# Patient Record
Sex: MALE | Race: ASIAN | NOT HISPANIC OR LATINO
[De-identification: names, ages, dates, MRNs, and addresses within clinical notes are randomized per-mention and may not be internally consistent; named-entity substitution may affect disease eponyms.]

---

## 2021-05-17 ENCOUNTER — APPOINTMENT (OUTPATIENT)
Dept: DISASTER EMERGENCY | Facility: OTHER | Age: 26
End: 2021-05-17
Payer: MEDICAID

## 2021-05-17 PROCEDURE — 0012A: CPT

## 2021-10-04 ENCOUNTER — EMERGENCY (EMERGENCY)
Facility: HOSPITAL | Age: 26
LOS: 1 days | Discharge: HOME | End: 2021-10-04
Attending: EMERGENCY MEDICINE | Admitting: EMERGENCY MEDICINE
Payer: MEDICAID

## 2021-10-04 VITALS
HEART RATE: 88 BPM | RESPIRATION RATE: 18 BRPM | SYSTOLIC BLOOD PRESSURE: 122 MMHG | OXYGEN SATURATION: 100 % | DIASTOLIC BLOOD PRESSURE: 80 MMHG | TEMPERATURE: 98 F

## 2021-10-04 PROCEDURE — 99284 EMERGENCY DEPT VISIT MOD MDM: CPT

## 2021-10-04 RX ORDER — IBUPROFEN 200 MG
1 TABLET ORAL
Qty: 9 | Refills: 0
Start: 2021-10-04 | End: 2021-10-06

## 2021-10-04 RX ORDER — GUAIACOL AND EUGENOL .0416; .0416 G/G; G/G
1 PASTE DENTAL
Qty: 6 | Refills: 0
Start: 2021-10-04 | End: 2021-10-06

## 2021-10-04 RX ORDER — IBUPROFEN 200 MG
600 TABLET ORAL ONCE
Refills: 0 | Status: COMPLETED | OUTPATIENT
Start: 2021-10-04 | End: 2021-10-04

## 2021-10-04 RX ADMIN — Medication 600 MILLIGRAM(S): at 17:49

## 2021-10-04 NOTE — ED PROVIDER NOTE - NSFOLLOWUPCLINICS_GEN_ALL_ED_FT
[facility]  Dental  [address] [place] [address]  Phone: [phone]  Fax:     Oral & Maxillofacial Surgery  Department of Dental Medicine  [phone] [place], [place] [address]  Phone: [phone]  Fax: [phone]

## 2021-10-04 NOTE — PROGRESS NOTE ADULT - SUBJECTIVE AND OBJECTIVE BOX
CC: 27 y/o presents with pain in the with no associated swelling.    HPI: Patient reports pain started 3 days ago.    Med HX: No pertinent family history in first degree relatives    Acid reflux    No significant past surgical history    DENTAL PAIN    SysAdmin_VisitLink        Social Hx: non-contributory    EOE:   TMJ (WNL)  Trismus (-)  LAD (+)Left Swollen submandibular lymph node  Dysphagia (-)  Swelling (-)    IOE: Permanent dentition grossly intact. #19 buccal caries  Hard/Soft palate (WNL)  Tongue/Floor of Mouth (WNL)  Buccal Mucosa (WNL)  Percussion (+): #17   Palpation (-)  Mobility (-)   Swelling (-)    Radiographs: PA of lower left and panoramic taken and evaluated.   Noted possible PARL associated with 17. Recurrent caries likely to pulp on #17.     Assessment: Recurrent caries on #17 and #18. #17- Acute/Symptomatic apical periodontitis #18- Buccal and recurrent caries   (-) swelling/fistula/active signs of infection.     Treatment: Discussed clinical and radiographic findings with patient. No treatment indicated at this time due to no associated facial or gingival swelling, abscess present, or fistula present. Recommended patient be referred to either outpatient private dentist or [facility] adult dental for comprehensive dental care. All questions answered.     Recommendations:   1. Abx as per med team. OTC pain medications as needed.  2. Seek comprehensive dental care with outpatient private dentist or [facility] adult dental clinic [phone].  5. If any difficulty breathing/swallowing or fever and swelling occur, return to ED.    Dana Benitez DDS #[id number]

## 2021-10-04 NOTE — ED PROVIDER NOTE - ATTENDING CONTRIBUTION TO CARE
Patient is a 25 yo M with no chronic medical problems here for left sided molar pain for 3 days. Denies fevers, chills, swelling. He tried ibuprofen without relief. He states he tried getting a dentist but there is a language problem.     VS noted  Gen. no acute distress, Non toxic   HEENT: EOMI, mmm, left molar, ttp, no abscess noted  Lungs: CTAB/L no C/ W /R   CVS: RRR   Abd; Soft non tender, non distended   Ext: no edema  Skin: no rash  Neuro AAOx3 non focal clear speech  a/p: dental pain - plan for dental evaluation, pain control and reassess.   - Felix CARRASQUILLO

## 2021-10-04 NOTE — ED PROVIDER NOTE - PHYSICAL EXAMINATION
PHYSICAL EXAM:    GENERAL: Lying in bed comfortably  HEAD:  Normocephalic  EYES: EOMI, PERRLA, conjunctiva and sclera clear  ENT: On oral cavity exam, poor dental hygiene, with multiple cavities that are filled. Has tenderness at the location of gums of left lower jaw 1/2/3rd Molar. No apparent bleeding/discharge.  NECK: Supple  LUNG: CTA b/l; no r/r/w  HEART: RRR, +S1/S2; No m/r/g  ABDOMEN: soft, NT/ND; BS audible   EXTREMITIES:  2+ Peripheral Pulses. No clubbing, cyanosis, or edema  NERVOUS SYSTEM:  AAOx3, speech clear. No sensory/motor deficits   SKIN: No rashes or lesions

## 2021-10-04 NOTE — ED PROVIDER NOTE - NS ED ROS FT
CONSTITUTIONAL: c/o weakness, No fevers or chills  EYES/ENT: c/o toothache. No visual changes;  No vertigo or throat pain   NECK: No pain   RESPIRATORY: No cough, wheezing, shortness of breath  CARDIOVASCULAR: No chest pain, palpitations  GASTROINTESTINAL: No abdominal pain, nausea, vomiting, diarrhea or constipation.   GENITOURINARY: No dysuria, frequency  NEUROLOGICAL: No numbness or weakness  SKIN: No rashes, or lesions     All other review of systems is negative unless indicated above.

## 2021-10-04 NOTE — ED PROVIDER NOTE - CLINICAL SUMMARY MEDICAL DECISION MAKING FREE TEXT BOX
26 Yr old female now presents with left lower jaw toothache for 2 days. VS wnl. PE On oral cavity exam, poor dental hygiene, with multiple cavities that are filled. Has tenderness at the location of gums of left lower jaw 1/2/3rd Molar. No apparent bleeding/discharge. Otherwise rest is wnl. Plan for analgesia, dental review.

## 2021-10-04 NOTE — ED PROVIDER NOTE - PROGRESS NOTE DETAILS
Dental evaluating patient. Per dental, Augmentin for 1 week. lymph node on left, submandibular, follows up in dental clinic. soft food diet. 17 will have taken out, 18 has a cavity. Follow up asap.

## 2021-10-04 NOTE — ED PROVIDER NOTE - OBJECTIVE STATEMENT
26 Yr old female now presents with left lower jaw toothache for 2 days.   Per patient, left lower jaw gums in territory of 1st, 2nd and 3rd Molar are tender. No fever, apparent discharge, throat ache, breathing difficulty, swallowing difficulty. He was due to follow up with his dentist h/e was unsuccessful in arranging an early appointment so came to ED for evaluation. He denies smoking, tobacco chewing, betel nut use.   He has a history of poor oral hygiene and prior dental work in past (Multiple gum cavities requiring fillings).   Also relates a prior history of minimal gum bleed which is now improving after he switched his toothpaste to Sensodyne.

## 2021-10-04 NOTE — ED PROVIDER NOTE - PATIENT PORTAL LINK FT
You can access the FollowMyHealth Patient Portal offered by [facility] by registering at the following website: [email or web address]. By joining [employer]’s FollowMyHealth portal, you will also be able to view your health information using other applications (apps) compatible with our system.

## 2024-09-20 ENCOUNTER — EMERGENCY (EMERGENCY)
Facility: HOSPITAL | Age: 29
LOS: 1 days | Discharge: HOME | End: 2024-09-20
Attending: STUDENT IN AN ORGANIZED HEALTH CARE EDUCATION/TRAINING PROGRAM | Admitting: STUDENT IN AN ORGANIZED HEALTH CARE EDUCATION/TRAINING PROGRAM
Payer: COMMERCIAL

## 2024-09-20 VITALS
HEIGHT: 70 IN | WEIGHT: 223.55 LBS | RESPIRATION RATE: 18 BRPM | OXYGEN SATURATION: 97 % | HEART RATE: 91 BPM | SYSTOLIC BLOOD PRESSURE: 130 MMHG | TEMPERATURE: 98 F | DIASTOLIC BLOOD PRESSURE: 85 MMHG

## 2024-09-20 VITALS
HEART RATE: 98 BPM | OXYGEN SATURATION: 99 % | DIASTOLIC BLOOD PRESSURE: 77 MMHG | SYSTOLIC BLOOD PRESSURE: 122 MMHG | RESPIRATION RATE: 17 BRPM | TEMPERATURE: 99 F

## 2024-09-20 PROBLEM — K21.9 GASTRO-ESOPHAGEAL REFLUX DISEASE WITHOUT ESOPHAGITIS: Chronic | Status: ACTIVE | Noted: 2021-10-04

## 2024-09-20 LAB
ALBUMIN SERPL ELPH-MCNC: 4.6 G/DL — SIGNIFICANT CHANGE UP (ref 3.3–5)
ALP SERPL-CCNC: 69 U/L — SIGNIFICANT CHANGE UP (ref 40–120)
ALT FLD-CCNC: 65 U/L — HIGH (ref 4–41)
ANION GAP SERPL CALC-SCNC: 15 MMOL/L — HIGH (ref 7–14)
AST SERPL-CCNC: 32 U/L — SIGNIFICANT CHANGE UP (ref 4–40)
BASOPHILS # BLD AUTO: 0.04 K/UL — SIGNIFICANT CHANGE UP (ref 0–0.2)
BASOPHILS NFR BLD AUTO: 0.5 % — SIGNIFICANT CHANGE UP (ref 0–2)
BILIRUB SERPL-MCNC: 0.6 MG/DL — SIGNIFICANT CHANGE UP (ref 0.2–1.2)
BUN SERPL-MCNC: 10 MG/DL — SIGNIFICANT CHANGE UP (ref 7–23)
CALCIUM SERPL-MCNC: 9.5 MG/DL — SIGNIFICANT CHANGE UP (ref 8.4–10.5)
CHLORIDE SERPL-SCNC: 102 MMOL/L — SIGNIFICANT CHANGE UP (ref 98–107)
CO2 SERPL-SCNC: 23 MMOL/L — SIGNIFICANT CHANGE UP (ref 22–31)
CREAT SERPL-MCNC: 0.8 MG/DL — SIGNIFICANT CHANGE UP (ref 0.5–1.3)
EGFR: 124 ML/MIN/1.73M2 — SIGNIFICANT CHANGE UP
EOSINOPHIL # BLD AUTO: 0.36 K/UL — SIGNIFICANT CHANGE UP (ref 0–0.5)
EOSINOPHIL NFR BLD AUTO: 4.5 % — SIGNIFICANT CHANGE UP (ref 0–6)
GLUCOSE SERPL-MCNC: 102 MG/DL — HIGH (ref 70–99)
HCT VFR BLD CALC: 43.9 % — SIGNIFICANT CHANGE UP (ref 39–50)
HGB BLD-MCNC: 14.4 G/DL — SIGNIFICANT CHANGE UP (ref 13–17)
IANC: 4.71 K/UL — SIGNIFICANT CHANGE UP (ref 1.8–7.4)
IMM GRANULOCYTES NFR BLD AUTO: 0.4 % — SIGNIFICANT CHANGE UP (ref 0–0.9)
LYMPHOCYTES # BLD AUTO: 2.07 K/UL — SIGNIFICANT CHANGE UP (ref 1–3.3)
LYMPHOCYTES # BLD AUTO: 25.7 % — SIGNIFICANT CHANGE UP (ref 13–44)
MCHC RBC-ENTMCNC: 27.3 PG — SIGNIFICANT CHANGE UP (ref 27–34)
MCHC RBC-ENTMCNC: 32.8 GM/DL — SIGNIFICANT CHANGE UP (ref 32–36)
MCV RBC AUTO: 83.3 FL — SIGNIFICANT CHANGE UP (ref 80–100)
MONOCYTES # BLD AUTO: 0.86 K/UL — SIGNIFICANT CHANGE UP (ref 0–0.9)
MONOCYTES NFR BLD AUTO: 10.7 % — SIGNIFICANT CHANGE UP (ref 2–14)
NEUTROPHILS # BLD AUTO: 4.71 K/UL — SIGNIFICANT CHANGE UP (ref 1.8–7.4)
NEUTROPHILS NFR BLD AUTO: 58.2 % — SIGNIFICANT CHANGE UP (ref 43–77)
NRBC # BLD: 0 /100 WBCS — SIGNIFICANT CHANGE UP (ref 0–0)
NRBC # FLD: 0 K/UL — SIGNIFICANT CHANGE UP (ref 0–0)
PLATELET # BLD AUTO: 236 K/UL — SIGNIFICANT CHANGE UP (ref 150–400)
POTASSIUM SERPL-MCNC: 3.9 MMOL/L — SIGNIFICANT CHANGE UP (ref 3.5–5.3)
POTASSIUM SERPL-SCNC: 3.9 MMOL/L — SIGNIFICANT CHANGE UP (ref 3.5–5.3)
PROT SERPL-MCNC: 7.8 G/DL — SIGNIFICANT CHANGE UP (ref 6–8.3)
RBC # BLD: 5.27 M/UL — SIGNIFICANT CHANGE UP (ref 4.2–5.8)
RBC # FLD: 12 % — SIGNIFICANT CHANGE UP (ref 10.3–14.5)
SODIUM SERPL-SCNC: 140 MMOL/L — SIGNIFICANT CHANGE UP (ref 135–145)
TROPONIN T, HIGH SENSITIVITY RESULT: <6 NG/L — SIGNIFICANT CHANGE UP
WBC # BLD: 8.07 K/UL — SIGNIFICANT CHANGE UP (ref 3.8–10.5)
WBC # FLD AUTO: 8.07 K/UL — SIGNIFICANT CHANGE UP (ref 3.8–10.5)

## 2024-09-20 PROCEDURE — 71046 X-RAY EXAM CHEST 2 VIEWS: CPT | Mod: 26

## 2024-09-20 PROCEDURE — 99285 EMERGENCY DEPT VISIT HI MDM: CPT

## 2024-09-20 PROCEDURE — 93010 ELECTROCARDIOGRAM REPORT: CPT

## 2024-09-20 RX ORDER — FAMOTIDINE 10 MG/ML
20 INJECTION INTRAVENOUS ONCE
Refills: 0 | Status: COMPLETED | OUTPATIENT
Start: 2024-09-20 | End: 2024-09-20

## 2024-09-20 RX ORDER — MAGNESIUM, ALUMINUM HYDROXIDE 200-225/5
30 SUSPENSION, ORAL (FINAL DOSE FORM) ORAL ONCE
Refills: 0 | Status: COMPLETED | OUTPATIENT
Start: 2024-09-20 | End: 2024-09-20

## 2024-09-20 RX ADMIN — Medication 30 MILLILITER(S): at 05:23

## 2024-09-20 RX ADMIN — FAMOTIDINE 20 MILLIGRAM(S): 10 INJECTION INTRAVENOUS at 05:23

## 2024-09-20 NOTE — ED PROVIDER NOTE - ATTENDING CONTRIBUTION TO CARE
30 yo M who presents to the ED c/o L sided chest pain that began this evening with associated nausea. No other symptoms. Denies any recent trauma to the chest. EKG is NSR with no ST changes. Plan for labs, CXR, Gi cocktail, re-assess

## 2024-09-20 NOTE — ED ADULT NURSE NOTE - NSFALLUNIVINTERV_ED_ALL_ED
Bed/Stretcher in lowest position, wheels locked, appropriate side rails in place/Call bell, personal items and telephone in reach/Instruct patient to call for assistance before getting out of bed/chair/stretcher/Non-slip footwear applied when patient is off stretcher/[place] to call system/Physically safe environment - no spills, clutter or unnecessary equipment/Purposeful proactive rounding/Room/bathroom lighting operational, light cord in reach

## 2024-09-20 NOTE — ED PROVIDER NOTE - PROGRESS NOTE DETAILS
Kathi, PGY1: patient feels much improved with pepcid maalox. Labs and ekg negative for acs. WIll dispo home.

## 2024-09-20 NOTE — ED ADULT NURSE NOTE - OBJECTIVE STATEMENT
Pt handoff received, alert and oriented x4, able to move all extremities and follow commands. Pt reports left chest and shoulder pain x3hrs prior to ED arrival. Reports pain is worse with inhalation. Denies difficulty breathing, shortness of breath, dizziness, headache, nausea, vomiting, abdominal pain. Denies any past medical history. Placed on cardiac monitoring. Pending dispo.

## 2024-09-20 NOTE — ED PROVIDER NOTE - NSFOLLOWUPINSTRUCTIONS_ED_ALL_ED_FT
There were no signs of an emergency medical condition on completion of today's workup.  You will need further medical care and evaluation for this pain. It is likely your pain is caused by acid reflux or gastritis.    You can take over-the-counter Pepcid (Famotidine) for linger symptoms.     Follow up with your medical doctor in 2-3 days. You may also call [phone] to speak with a representative to assist follow up care with medicine, surgery, or specialists.    If you develop worsening chest pain, shortness of breath, nausea or vomiting, please return to the emergency room.    Return for any persistent, worsening symptoms, or ANY concerns at all.

## 2024-09-20 NOTE — ED PROVIDER NOTE - OBJECTIVE STATEMENT
Beatriz Calderónrex is a 30yo no PMH presenting with left sided chest pain that began at 8pm. States this is the first time this has happened. Pain radiates up the left side of his neck. Notes that he has Beatriz Maya is a 30yo no PMH presenting with left sided chest pain that began at 8pm. States this is the first time this has happened. Pain radiates up the left side of his neck. Notes that he has had belching that has made the pain worse. Endorses one episode of vomiting i/s/o this pain. He denies dizziness, abdominal pain, fever, changes in bowel or bladder.

## 2024-09-20 NOTE — ED PROVIDER NOTE - CLINICAL SUMMARY MEDICAL DECISION MAKING FREE TEXT BOX
Kathi, PGY1: Beatriz Maya is a 30yo no PMH presenting with left sided chest pain that began at 8pm with associated nausea/vomiting. Physical exam is unremarkable. Differential includes, but is not limited to: ACS, GERD, costochondritis. WIll do cardiac work up (trop, ekg, CXR) and give pepcid/maalox. Presentation appears consistent with GERD but will r/o ACS. If negative, will dispo home.

## 2024-09-20 NOTE — ED PROVIDER NOTE - PHYSICAL EXAMINATION
Const: Awake, alert, no acute distress.  Appears uncomfortable.   Moving comfortably on stretcher.  HEENT: NC/AT.  Moist mucous membranes.  No pharyngeal erythema, no exudates.  Eyes: Extraocular movements intact b/l.  Pupils equal, round, and reactive to light b/l.  Conjunctiva pink.  No scleral icterus.  Neck: Neck supple, full ROM without pain.  Cardiac: Regular rate and regular rhythm. S1 S2 present.   Resp: Speaking in full sentences. No evidence of respiratory distress.  Good air entry b/l, breath sounds clear to auscultation b/l.  Abd: Non-distended, no overlying skin changes.  Soft, non-tender, no guarding, no rigidity, no rebound tenderness.  No palpable masses.  MSK: Normal ROM and strength throughout.  Skin: Normal coloration.  No rashes, abrasions or lacerations.  Neuro: Awake, alert & oriented x 3.  Moves all extremities spontaneously and symmetrically.  No focal deficits.

## 2024-09-20 NOTE — ED ADULT NURSE REASSESSMENT NOTE - NS ED NURSE REASSESS COMMENT FT1
BREAK RN: Pt resting in stretcher, breathing even and unlabored on room air. Vitals stable, NSR in 90s, breathing even and unlabored on room air. 22G IV placed to right hand, labs drawn and sent. Medicated as ordered. NAD noted, pt offers no complaints at this time. Safety maintained.

## 2024-09-20 NOTE — ED PROVIDER NOTE - PATIENT PORTAL LINK FT
You can access the FollowMyHealth Patient Portal offered by [facility] by registering at the following website: [email or web address]. By joining [employer]’s FollowMyHealth portal, you will also be able to view your health information using other applications (apps) compatible with our system.